# Patient Record
Sex: MALE | URBAN - METROPOLITAN AREA
[De-identification: names, ages, dates, MRNs, and addresses within clinical notes are randomized per-mention and may not be internally consistent; named-entity substitution may affect disease eponyms.]

---

## 2019-05-22 ENCOUNTER — OFFICE VISIT (OUTPATIENT)
Dept: URBAN - METROPOLITAN AREA CLINIC 18 | Facility: CLINIC | Age: 49
End: 2019-05-22
Payer: COMMERCIAL

## 2019-05-22 DIAGNOSIS — H17.89 OTHER CORNEAL SCAR: ICD-10-CM

## 2019-05-22 PROCEDURE — 99214 OFFICE O/P EST MOD 30 MIN: CPT | Performed by: OPTOMETRIST

## 2019-05-22 ASSESSMENT — INTRAOCULAR PRESSURE
OD: 14
OS: 13

## 2019-05-22 ASSESSMENT — KERATOMETRY
OS: 44.50
OD: 44.50

## 2019-05-22 NOTE — IMPRESSION/PLAN
Impression: Other corneal scar: H17.89. OD. Stable. Plan: Discussed diagnosis in detail with patient. No treatment is required at this time. Will continue to observe condition and or symptoms.

## 2019-05-22 NOTE — IMPRESSION/PLAN
Impression: Drusen (degenerative) of macula, left eye: H35.362. Plan: No treatment is required at this time. Will continue to observe condition and or symptoms.

## 2019-05-22 NOTE — IMPRESSION/PLAN
Impression: Type 2 diabetes mellitus w/o complication: V22.2. Plan: Diabetes type II: no background retinopathy, no signs of neovascularization noted. Discussed ocular and systemic benefits of blood sugar control.

## 2020-12-22 ENCOUNTER — OFFICE VISIT (OUTPATIENT)
Dept: URBAN - METROPOLITAN AREA CLINIC 18 | Facility: CLINIC | Age: 50
End: 2020-12-22
Payer: COMMERCIAL

## 2020-12-22 DIAGNOSIS — E11.9 TYPE 2 DIABETES MELLITUS W/O COMPLICATION: Primary | ICD-10-CM

## 2020-12-22 DIAGNOSIS — H35.362 DRUSEN (DEGENERATIVE) OF MACULA, LEFT EYE: ICD-10-CM

## 2020-12-22 DIAGNOSIS — H11.153 PINGUECULA, BILATERAL: ICD-10-CM

## 2020-12-22 PROCEDURE — 92014 COMPRE OPH EXAM EST PT 1/>: CPT | Performed by: OPTOMETRIST

## 2020-12-22 ASSESSMENT — INTRAOCULAR PRESSURE
OS: 15
OD: 13

## 2020-12-22 NOTE — IMPRESSION/PLAN
Impression: Type 2 diabetes mellitus w/o complication: I55.6. Bilateral. Plan: No Non-Proliferative Diabetic Retinopathy, no Diabetic Macular Edema and no Neovascularization of the iris, disc, or elsewhere. Discussed ocular and systemic benefits of blood sugar control. Send notes to PCP. Check annually. RTC 1 year x CEE.

## 2020-12-22 NOTE — IMPRESSION/PLAN
Impression: Drusen (degenerative) of macula, left eye: H35.362. Left. Plan: Pt ed re: condition, advised to use [employer] grid for home monitoring of vision changes. Condition not advanced enough for AREDS vitamins at this time, consider if progression in the future.   
RTC 1 year x dilated exam.

## 2020-12-22 NOTE — IMPRESSION/PLAN
Impression: Pinguecula, bilateral: H11.153. Bilateral. Plan: Condition is stable, no further treatment at this time. Monitor.